# Patient Record
Sex: MALE | Employment: UNEMPLOYED | URBAN - METROPOLITAN AREA
[De-identification: names, ages, dates, MRNs, and addresses within clinical notes are randomized per-mention and may not be internally consistent; named-entity substitution may affect disease eponyms.]

---

## 2021-09-29 LAB — CREATININE, EXTERNAL: 0.87

## 2021-10-26 ENCOUNTER — OFFICE VISIT (OUTPATIENT)
Dept: INTERNAL MEDICINE CLINIC | Age: 31
End: 2021-10-26
Payer: MEDICAID

## 2021-10-26 VITALS
WEIGHT: 191 LBS | DIASTOLIC BLOOD PRESSURE: 88 MMHG | HEART RATE: 86 BPM | OXYGEN SATURATION: 98 % | TEMPERATURE: 98.1 F | RESPIRATION RATE: 20 BRPM | SYSTOLIC BLOOD PRESSURE: 124 MMHG

## 2021-10-26 DIAGNOSIS — M79.641 BILATERAL HAND PAIN: Primary | ICD-10-CM

## 2021-10-26 DIAGNOSIS — M79.642 BILATERAL HAND PAIN: Primary | ICD-10-CM

## 2021-10-26 PROCEDURE — 99203 OFFICE O/P NEW LOW 30 MIN: CPT | Performed by: PHYSICIAN ASSISTANT

## 2021-10-26 NOTE — PROGRESS NOTES
Chief Complaint   Patient presents with    Hand Pain     he is a 33y.o. year old male who presents for evaluation. Presents to the office today to get established. He reports that he recently moved from [place] back on August 16, 2021. Reports that he is interested in getting a job here in [facility] but has noticed that he has some pain and swelling of his hands. To bring the refrigerator down from the 6 floor to the bottom floor. He states 48 hours after this he started to notice he has some swelling of his hands. The patient reports that eventually the swelling went away. He reports here recently in the past couple weeks he was carrying some bags and noticed that he once again experienced some swelling of his hand as well as some itching. The patient states that he has not taken anything for the symptoms. He reports that he does not have pain of his wrist or his elbow. He is concerned because he would like to be able to get a job and is concerned that if he has the swelling that he will not be able to work. History reviewed. No pertinent past medical history. History reviewed. No pertinent surgical history. Family History   Problem Relation Age of Onset    Diabetes Mother     Cancer Maternal Grandfather         not sure what kind     Social History     Socioeconomic History    Marital status: UNKNOWN     Spouse name: Not on file    Number of children: Not on file    Years of education: Not on file    Highest education level: Not on file   Tobacco Use    Smoking status: Current Every Day Smoker     Types: Cigarettes    Smokeless tobacco: Never Used   Vaping Use    Vaping Use: Never used   Substance and Sexual Activity    Alcohol use:  Yes    Drug use: Never    Sexual activity: Yes     Partners: Female     Social Determinants of Health     Financial Resource Strain:     Difficulty of Paying Living Expenses:    Food Insecurity:     Worried About Running Out of Food in the Last Year:     Ran Out of Food in the Last Year:    Transportation Needs:     Lack of Transportation (Medical):  Lack of Transportation (Non-Medical):    Physical Activity:     Days of Exercise per Week:     Minutes of Exercise per Session:    Stress:     Feeling of Stress :    Social Connections:     Frequency of Communication with Friends and Family:     Frequency of Social Gatherings with Friends and Family:     Attends Protestant Services:     Active Member of Clubs or Organizations:     Attends Club or Organization Meetings:     Marital Status:          Review of Systems - negative except as listed above    Objective:     Vitals:    10/26/21 0820   BP: 124/88   Pulse: 86   Resp: 20   Temp: 98.1 °F (36.7 °C)   TempSrc: Temporal   SpO2: 98%   Weight: 191 lb (86.6 kg)     Physical Examination: General appearance - alert, well appearing, and in no distress  Mental status - normal mood, behavior, speech, dress, motor activity, and thought processes  Musculoskeletal - no joint tenderness, deformity or swelling, I am not able to appreciate abnormality during the physical exam    Assessment/ Plan:   Diagnoses and all orders for this visit:    1. Bilateral hand pain  -     REFERRAL TO ORTHOPEDIC SURGERY    The patient I am not sure as to why he gets intermittent swelling when he is lifting things. During exam the patient was not swollen or tender. I also asked the patient multiple times if he was experiencing swelling of his joints. The patient states that he is not experiencing swelling of his joint it appears to be the areas between his joints. I have given the patient a referral to see a hand specialist to see if they can figure out the cause. Patient has some labs done within regular health department. This will be scanned into his chart. I have discussed the diagnosis with the patient and the intended plan as seen in the above orders.   The patient has received an after-visit summary and questions were answered concerning future plans.      Medication Side Effects and Warnings were discussed with patient: n/a  Patient Labs were reviewed and or requested: n/a  Patient Past Records were reviewed and or requested  yes    Neyda Guerra PA-C

## 2021-11-29 ENCOUNTER — OFFICE VISIT (OUTPATIENT)
Dept: ORTHOPEDIC SURGERY | Age: 31
End: 2021-11-29
Payer: MEDICAID

## 2021-11-29 VITALS — WEIGHT: 192 LBS

## 2021-11-29 DIAGNOSIS — M79.642 PAIN IN BOTH HANDS: Primary | ICD-10-CM

## 2021-11-29 DIAGNOSIS — M79.641 PAIN IN BOTH HANDS: Primary | ICD-10-CM

## 2021-11-29 PROCEDURE — 99213 OFFICE O/P EST LOW 20 MIN: CPT | Performed by: PHYSICIAN ASSISTANT

## 2021-11-29 NOTE — PROGRESS NOTES
HPI: Katina Wright (: 1990) is a 33 y.o. male, patient, here for evaluation of the following chief complaint(s): Bilateral hand pain, numbness and tingling which has been bothering him for approximately 5 months following carrying a heavy refrigerator. Has been worsening over the last 2 months. It is not awakening him. No direct injury or trauma. No further complaints or concerns. He presents today in follow-up to review his most recent bilateral upper extremity EMG study. His symptoms have improved, however he states that he does experience some hand discomfort if he lifts heavy items. Hand Pain (Bilateral)       Vitals: Wt 192 lb (87.1 kg)    There is no height or weight on file to calculate BMI. No Known Allergies    Current Outpatient Medications   Medication Sig    MELATONIN PO Take  by mouth nightly. No current facility-administered medications for this visit. No past medical history on file. No past surgical history on file. Family History   Problem Relation Age of Onset    Diabetes Mother     Cancer Maternal Grandfather         not sure what kind        Social History     Tobacco Use    Smoking status: Current Every Day Smoker     Types: Cigarettes    Smokeless tobacco: Never Used   Vaping Use    Vaping Use: Never used   Substance Use Topics    Alcohol use: Yes    Drug use: Never        Review of Systems    Constitutional: No fevers, chills, night sweats, excessive fatigue or weight loss. Musculoskeletal: No joint pain, swelling or redness. No decreased range of motion. Neurologic: No headache, blurred vision, and no areas of focal weakness or numbness. Normal gait. No sensory problems. Respiratory: No dyspnea on exertion, orthopnea, chest pain, cough or hemoptysis.   Cardiovascular: No anginal chest pain, irregular heart beat, tachycardia, palpitations or orthopnea  Integumentary: No chronic rashes, inflammation, ulcerations, pruritus, petechiae, purpura, ecchymoses, or skin changes           Physical Exam    General: Alert, cooperative, no distress  Musculosketal: Right wrist - no swelling, edema or warmth, no tenderness to palpation, no pain, normal deep tendon reflexes coordination, normal strength and tone, normal range of motion, no crepitus, normal sensation, no instability or laxity and no known fractures or deformities. Negative Tinel's, Phalen's and Finkelstein test.  Left wrist - no swelling, edema or warmth, no tenderness to palpation, no pain, normal deep tendon reflexes coordination, normal strength and tone, normal range of motion, no crepitus, normal sensation, no instability or laxity and no known fractures or deformities. Negative Tinel's, Phalen's and Finkelstein test.  Right hand - Normal range of motion, normal strength and tone, normal sensation. No deformities. Left hand -  Normal range of motion, normal strength and tone, normal sensation. No deformities. Neurologic:  CNII-XII intact, Normal strength, sensation, and reflexes throughout    Imaging:  None     ASSESSMENT/PLAN:  Below is the assessment and plan developed based on review of pertinent history, physical exam, labs, studies, and medications. Bilateral hand pain, numbness and tingling which has been bothering him for approximately 5 months following carrying a heavy refrigerator. Has been worsening over the last 2 months. It is not awakening him. No direct injury or trauma. No further complaints or concerns. He presents today in follow-up to review his most recent bilateral upper extremity EMG study. The results reviewed with the patient. He does not have carpal tunnel syndrome, cervical radiculopathy or ulnar neuritis. Patient was reassured there were no findings on the study. He will gradually increase his weight as tolerated. He will follow up as needed. 1. Pain in both hands      Return if symptoms worsen or fail to improve.     Dr. Chi Delaney was available for immediate consult during this encounter. An electronic signature was used to authenticate this note.   -- Lisa Corea PA-C

## 2022-03-20 PROBLEM — M79.642 PAIN IN BOTH HANDS: Status: ACTIVE | Noted: 2021-11-29

## 2022-03-20 PROBLEM — M79.641 PAIN IN BOTH HANDS: Status: ACTIVE | Noted: 2021-11-29

## 2023-08-23 ENCOUNTER — HOSPITAL ENCOUNTER (OUTPATIENT)
Facility: HOSPITAL | Age: 33
Discharge: HOME | End: 2023-08-26
Attending: INTERNAL MEDICINE
Payer: MEDICAID

## 2023-08-23 DIAGNOSIS — N17.9 ACUTE KIDNEY INJURY (NONTRAUMATIC) (HCC): ICD-10-CM

## 2023-08-23 PROCEDURE — 76770 US EXAM ABDO BACK WALL COMP: CPT
